# Patient Record
Sex: MALE | Race: WHITE
[De-identification: names, ages, dates, MRNs, and addresses within clinical notes are randomized per-mention and may not be internally consistent; named-entity substitution may affect disease eponyms.]

---

## 2019-04-16 ENCOUNTER — HOSPITAL ENCOUNTER (EMERGENCY)
Dept: HOSPITAL 25 - ED | Age: 24
Discharge: HOME | End: 2019-04-16
Payer: COMMERCIAL

## 2019-04-16 VITALS — DIASTOLIC BLOOD PRESSURE: 88 MMHG | SYSTOLIC BLOOD PRESSURE: 135 MMHG

## 2019-04-16 DIAGNOSIS — F17.210: ICD-10-CM

## 2019-04-16 DIAGNOSIS — R23.3: Primary | ICD-10-CM

## 2019-04-16 LAB
APTT PPP: 31.1 SECONDS (ref 26–36.3)
BASOPHILS # BLD AUTO: 0.1 10^3/UL (ref 0–0.2)
EOSINOPHIL # BLD AUTO: 0.1 10^3/UL (ref 0–0.6)
HCT VFR BLD AUTO: 42 % (ref 36–46)
HGB BLD-MCNC: 13.7 G/DL (ref 14–18)
INR PPP/BLD: 0.94 (ref 0.77–1.02)
LYMPHOCYTES # BLD AUTO: 1.9 10^3/UL (ref 1–4.8)
MCH RBC QN AUTO: 29 PG (ref 27–31)
MCHC RBC AUTO-ENTMCNC: 33 G/DL (ref 31–36)
MCV RBC AUTO: 88 FL (ref 80–94)
MONOCYTES # BLD AUTO: 0.5 10^3/UL (ref 0–0.8)
NEUTROPHILS # BLD AUTO: 7.9 10^3/UL (ref 1.5–7.7)
NRBC # BLD AUTO: 0 10^3/UL
NRBC BLD QL AUTO: 0
PLATELET # BLD AUTO: 313 10^3/UL (ref 150–450)
RBC # BLD AUTO: 4.72 10^6 /UL (ref 4.18–5.48)
WBC # BLD AUTO: 10.4 10^3/UL (ref 3.5–10.8)

## 2019-04-16 PROCEDURE — 85025 COMPLETE CBC W/AUTO DIFF WBC: CPT

## 2019-04-16 PROCEDURE — 36415 COLL VENOUS BLD VENIPUNCTURE: CPT

## 2019-04-16 PROCEDURE — 85610 PROTHROMBIN TIME: CPT

## 2019-04-16 PROCEDURE — 99283 EMERGENCY DEPT VISIT LOW MDM: CPT

## 2019-04-16 PROCEDURE — 85730 THROMBOPLASTIN TIME PARTIAL: CPT

## 2019-04-16 NOTE — ED
GI/ HPI





- HPI Summary


HPI Summary: 


Patient is a 23 y/o M presenting to ED with complaints of abdominal bruising 

and blood in stool. He states that he has "multiple" GI issues and is scheduled 

for colonscopy. Patient has had bruise on RLQ area for the past few weeks. He 

states that this bruise spontaneously went away. However, today, he took a nap 

and then the bruise reappeared. He notes that he did not experience any trauma 

to this area. Patient is on lithium and effexor. No other bruises on body are 

reported. Patient notes that the bruise is darker in color and slightly larger 

than previously. On triage, pain is rated 2/10. Nothing is noted to aggravate/

alleviate Sx. Home medications and allergies are reviewed.  








- History of Current Complaint


Chief Complaint: EDGIBleed


Time Seen by Provider: 04/16/19 01:10


Stated Complaint: "POSS GI PROBLEM, WITH BRUISING ON ABD" PER PT


Hx Obtained From: Patient


Onset/Duration: Started Weeks Ago - bruise first appeared a few weeks ago, 

Still Present, Worse Since - bruised area expanded/got darker today


Timing: Lasting Hours - bruised area expanded/got darker today, Lasting Weeks - 

bruise first appeared a few weeks ago


Current Severity: Mild - 2/10


Pain Intensity: 2


Associated Signs and Symptoms: Positive: Bruising, Blood w/Stool


Aggravating Factor(s): Nothing


Alleviating Factor(s): Nothing





- Allergy/Home Medications


Allergies/Adverse Reactions: 


 Allergies











Allergy/AdvReac Type Severity Reaction Status Date / Time


 


bupropion [From Wellbutrin] Allergy  Unknown Verified 04/15/19 10:56





   Reaction  





   Details  


 


oxcarbazepine Allergy  Unknown Verified 04/15/19 10:56





[From Trileptal]   Reaction  





   Details  














PMH/Surg Hx/FS Hx/Imm Hx


Sensory History: 


   Denies: Hx Legally Blind, Hx Deafness


Opthamlomology History: 


   Denies: Hx Legally Blind


EENT History: 


   Denies: Hx Deafness


Infectious Disease History: No


Infectious Disease History: 


   Denies: Traveled Outside the [place] in Last 30 Days





- Family History


Known Family History: Positive: Diabetes, Other - thyroid problems, stomach 

ulcers 





- Social History


Alcohol Use: Occasionally


Substance Use Type: Reports: Cocaine, Marijuana


Smoking Status (MU): Current Every Day Smoker





Review of Systems


Negative: Fever - on vitals, temp is 98.1 F 


Genitourinary: Other - POSITIVE - BLOOD IN STOOL 


Musculoskeletal: Other - POSITIVE - ECCHYMOTIC AREA AT RLQ 


All Other Systems Reviewed And Are Negative: Yes





Physical Exam





- Summary


Physical Exam Summary: 


VITAL SIGNS: Reviewed.


GENERAL:  Patient is a well-developed and nourished male who is lying 

comfortable in the stretcher. Patient is not in any acute respiratory distress.


HEAD AND FACE: No signs of trauma. No ecchymosis, hematomas or skull 

depressions. No sinus tenderness.


EYES: PERRLA, EOMI x 2, No injected conjunctiva, no nystagmus.


EARS: Hearing grossly intact. Ear canals and tympanic membranes are within 

normal limits.


MOUTH: Oropharynx within normal limits.


NECK: Supple, trachea is midline, no adenopathy, no JVD, no carotid bruit, no c-

spine tenderness, neck with full ROM.


CHEST: Symmetric, no tenderness at palpation


LUNGS: Clear to auscultation bilaterally. No wheezing or crackles.


CVS: Regular rate and rhythm, S1 and S2 present, no murmurs or gallops 

appreciated.


ABDOMEN: Soft, non-tender. No signs of distention. No rebound no guarding, and 

no masses palpated. Bowel sounds are normal.


EXTREMITIES: FROM in all major joints, no edema, no cyanosis or clubbing.


NEURO: Alert and oriented x 3. No acute neurological deficits. Speech is normal 

and follows commands.


SKIN: Dry and warm; 2.5 x 1.5 inch ecchymotic area at RLQ that is tender








Triage Information Reviewed: Yes


Vital Signs On Initial Exam: 


 Initial Vitals











Temp Pulse Resp BP Pulse Ox


 


 98.1 F   98   20   148/101   98 


 


 04/16/19 00:20  04/16/19 00:20  04/16/19 00:20  04/16/19 00:20  04/16/19 00:20











Vital Signs Reviewed: Yes





Diagnostics





- Vital Signs


 Vital Signs











  Temp Pulse Resp BP Pulse Ox


 


 04/16/19 00:20  98.1 F  98  20  148/101  98














- Laboratory


Result Diagrams: 


 04/16/19 01:27





Lab Statement: Any lab studies that have been ordered have been reviewed, and 

results considered in the medical decision making process.





GIGU Course/Dx





- Course


Course Of Treatment: Patient is a 23 y/o M presenting to ED with complaints of 

abdominal bruising and blood in stool. He states that he has "multiple" GI 

issues and is scheduled for colonscopy. Patient has had bruise on RLQ area for 

the past few weeks. He states that this bruise spontaneously went away. However

, today, he took a nap and then the bruise reappeared. He notes that he did not 

experience any trauma to this area. Patient is on lithium and effexor. No other 

bruises on body are reported. Patient notes that the bruise is darker in color 

and slightly larger than previously.  On physical exam, 2.5 x 1.5 inch 

ecchymotic area at RLQ that is tender is noted.  Labs showed RBC 4.17, Hgb 12.5

, absolute monos 1, trop 0.01, CRP 34.63, total protein 6.2, globulin 1.9, TSH 

2.33.  During ED course, patinet was given Tylenol 650 mg PO.  .  Patient was 

discharged to home and will follow up with PCP





- Diagnoses


Provider Diagnoses: 


 Ecchymosis








Discharge





- Sign-Out/Discharge


Documenting (check all that apply): Patient Departure - discharge


Patient Received Moderate/Deep Sedation with Procedure: No





- Discharge Plan


Condition: Stable


Disposition: HOME


Patient Education Materials:  Contusion in Adults (ED)


Referrals: 


[facility] [Outside] - 3 Days


Additional Instructions: 


PLEASE RETURN TO THE EMERGENCY DEPARTMENT IMMEDIATELY FOR WORSENING OR 

CONCERNING SYMPTOMS. FOLLOW UP WITH YOUR PRIMARY CARE PHYSICIAN WITHIN THREE 

DAYS. 





- Attestation Statements


Document Initiated by Scribe: Yes


Documenting Scribe: STACEY ALMARAZ


Provider For Whom Jacobo is Documenting (Include Credential): STAR FENTON MD


Scribe Attestation: 


ISTACEY, scribed for STAR FENTON MD on 04/16/19 at 0309. 


Status of Scribe Document: Ready

## 2019-07-28 ENCOUNTER — HOSPITAL ENCOUNTER (EMERGENCY)
Dept: HOSPITAL 25 - UCEAST | Age: 24
Discharge: HOME | End: 2019-07-28
Payer: COMMERCIAL

## 2019-07-28 DIAGNOSIS — M79.662: Primary | ICD-10-CM

## 2019-07-28 DIAGNOSIS — F17.290: ICD-10-CM

## 2019-07-28 DIAGNOSIS — E11.9: ICD-10-CM

## 2019-07-28 DIAGNOSIS — F31.9: ICD-10-CM

## 2019-07-28 PROCEDURE — G0463 HOSPITAL OUTPT CLINIC VISIT: HCPCS

## 2019-07-28 PROCEDURE — 99212 OFFICE O/P EST SF 10 MIN: CPT

## 2019-08-14 ENCOUNTER — HOSPITAL ENCOUNTER (EMERGENCY)
Dept: HOSPITAL 25 - UCEAST | Age: 24
Discharge: HOME | End: 2019-08-14
Payer: COMMERCIAL

## 2019-08-14 VITALS — DIASTOLIC BLOOD PRESSURE: 77 MMHG | SYSTOLIC BLOOD PRESSURE: 135 MMHG

## 2019-08-14 DIAGNOSIS — F17.210: ICD-10-CM

## 2019-08-14 DIAGNOSIS — H10.9: Primary | ICD-10-CM

## 2019-08-14 DIAGNOSIS — F64.0: ICD-10-CM

## 2019-08-14 PROCEDURE — 99212 OFFICE O/P EST SF 10 MIN: CPT

## 2019-08-14 PROCEDURE — G0463 HOSPITAL OUTPT CLINIC VISIT: HCPCS

## 2019-10-29 ENCOUNTER — HOSPITAL ENCOUNTER (EMERGENCY)
Dept: HOSPITAL 25 - ED | Age: 24
Discharge: HOME | End: 2019-10-29
Payer: COMMERCIAL

## 2019-10-29 VITALS — DIASTOLIC BLOOD PRESSURE: 104 MMHG | SYSTOLIC BLOOD PRESSURE: 120 MMHG

## 2019-10-29 DIAGNOSIS — Z90.13: ICD-10-CM

## 2019-10-29 DIAGNOSIS — Z88.8: ICD-10-CM

## 2019-10-29 DIAGNOSIS — A74.9: ICD-10-CM

## 2019-10-29 DIAGNOSIS — K52.9: Primary | ICD-10-CM

## 2019-10-29 DIAGNOSIS — E11.9: ICD-10-CM

## 2019-10-29 DIAGNOSIS — F41.9: ICD-10-CM

## 2019-10-29 DIAGNOSIS — F17.290: ICD-10-CM

## 2019-10-29 DIAGNOSIS — Z79.899: ICD-10-CM

## 2019-10-29 LAB
ALBUMIN SERPL BCG-MCNC: 4.2 G/DL (ref 3.2–5.2)
ALBUMIN/GLOB SERPL: 1.4 {RATIO} (ref 1–3)
ALP SERPL-CCNC: 105 U/L (ref 34–104)
ALT SERPL W P-5'-P-CCNC: 27 U/L (ref 7–52)
ANION GAP SERPL CALC-SCNC: 7 MMOL/L (ref 2–11)
AST SERPL-CCNC: 30 U/L (ref 13–39)
BASOPHILS # BLD AUTO: 0 10^3/UL (ref 0–0.2)
BUN SERPL-MCNC: 17 MG/DL (ref 6–24)
BUN/CREAT SERPL: 21.3 (ref 8–20)
CALCIUM SERPL-MCNC: 9.3 MG/DL (ref 8.6–10.3)
CHLORIDE SERPL-SCNC: 106 MMOL/L (ref 101–111)
EOSINOPHIL # BLD AUTO: 0.2 10^3/UL (ref 0–0.6)
GLOBULIN SER CALC-MCNC: 3 G/DL (ref 2–4)
GLUCOSE SERPL-MCNC: 130 MG/DL (ref 70–100)
HCG SERPL QL: 1.31 MIU/ML
HCO3 SERPL-SCNC: 23 MMOL/L (ref 22–32)
HCT VFR BLD AUTO: 40 % (ref 35–47)
HGB BLD-MCNC: 13 G/DL (ref 12–16)
LYMPHOCYTES # BLD AUTO: 1.4 10^3/UL (ref 1–4.8)
MCH RBC QN AUTO: 29 PG (ref 27–31)
MCHC RBC AUTO-ENTMCNC: 33 G/DL (ref 31–36)
MCV RBC AUTO: 88 FL (ref 80–97)
MONOCYTES # BLD AUTO: 0.5 10^3/UL (ref 0–0.8)
NEUTROPHILS # BLD AUTO: 11.8 10^3/UL (ref 1.5–7.7)
NRBC # BLD AUTO: 0 10^3/UL
NRBC BLD QL AUTO: 0
PLATELET # BLD AUTO: 291 10^3/UL (ref 150–450)
POTASSIUM SERPL-SCNC: 3.8 MMOL/L (ref 3.5–5)
PROT SERPL-MCNC: 7.2 G/DL (ref 6.4–8.9)
RBC # BLD AUTO: 4.5 10^6 /UL (ref 3.7–4.87)
SODIUM SERPL-SCNC: 136 MMOL/L (ref 135–145)
WBC # BLD AUTO: 14 10^3/UL (ref 3.5–10.8)
WBC UR QL AUTO: (no result)

## 2019-10-29 PROCEDURE — 87591 N.GONORRHOEAE DNA AMP PROB: CPT

## 2019-10-29 PROCEDURE — 80053 COMPREHEN METABOLIC PANEL: CPT

## 2019-10-29 PROCEDURE — 84702 CHORIONIC GONADOTROPIN TEST: CPT

## 2019-10-29 PROCEDURE — 87491 CHLMYD TRACH DNA AMP PROBE: CPT

## 2019-10-29 PROCEDURE — 87086 URINE CULTURE/COLONY COUNT: CPT

## 2019-10-29 PROCEDURE — 99282 EMERGENCY DEPT VISIT SF MDM: CPT

## 2019-10-29 PROCEDURE — 36415 COLL VENOUS BLD VENIPUNCTURE: CPT

## 2019-10-29 PROCEDURE — 81015 MICROSCOPIC EXAM OF URINE: CPT

## 2019-10-29 PROCEDURE — 83690 ASSAY OF LIPASE: CPT

## 2019-10-29 PROCEDURE — 85025 COMPLETE CBC W/AUTO DIFF WBC: CPT

## 2019-10-29 PROCEDURE — 96374 THER/PROPH/DIAG INJ IV PUSH: CPT

## 2019-10-29 PROCEDURE — 86140 C-REACTIVE PROTEIN: CPT

## 2019-10-29 PROCEDURE — 96361 HYDRATE IV INFUSION ADD-ON: CPT

## 2019-10-29 PROCEDURE — 81003 URINALYSIS AUTO W/O SCOPE: CPT

## 2019-12-23 ENCOUNTER — HOSPITAL ENCOUNTER (EMERGENCY)
Dept: HOSPITAL 25 - ED | Age: 24
Discharge: HOME | End: 2019-12-23
Payer: COMMERCIAL

## 2019-12-23 VITALS — DIASTOLIC BLOOD PRESSURE: 116 MMHG | SYSTOLIC BLOOD PRESSURE: 165 MMHG

## 2019-12-23 DIAGNOSIS — E11.9: ICD-10-CM

## 2019-12-23 DIAGNOSIS — F43.10: ICD-10-CM

## 2019-12-23 DIAGNOSIS — F64.0: ICD-10-CM

## 2019-12-23 DIAGNOSIS — F41.9: ICD-10-CM

## 2019-12-23 DIAGNOSIS — F17.290: ICD-10-CM

## 2019-12-23 DIAGNOSIS — Z32.01: Primary | ICD-10-CM

## 2019-12-23 LAB
ALBUMIN SERPL BCG-MCNC: 4.2 G/DL (ref 3.2–5.2)
ALBUMIN/GLOB SERPL: 1.4 {RATIO} (ref 1–3)
ALP SERPL-CCNC: 88 U/L (ref 34–104)
ALT SERPL W P-5'-P-CCNC: 24 U/L (ref 7–52)
ANION GAP SERPL CALC-SCNC: 6 MMOL/L (ref 2–11)
AST SERPL-CCNC: 21 U/L (ref 13–39)
BASOPHILS # BLD AUTO: 0 10^3/UL (ref 0–0.2)
BUN SERPL-MCNC: 9 MG/DL (ref 6–24)
BUN/CREAT SERPL: 11.5 (ref 8–20)
CALCIUM SERPL-MCNC: 8.8 MG/DL (ref 8.6–10.3)
CHLORIDE SERPL-SCNC: 105 MMOL/L (ref 101–111)
EOSINOPHIL # BLD AUTO: 0.1 10^3/UL (ref 0–0.6)
GLOBULIN SER CALC-MCNC: 3.1 G/DL (ref 2–4)
GLUCOSE SERPL-MCNC: 94 MG/DL (ref 70–100)
HCG SERPL QL: 168.53 MIU/ML
HCO3 SERPL-SCNC: 25 MMOL/L (ref 22–32)
HCT VFR BLD AUTO: 38 % (ref 35–47)
HGB BLD-MCNC: 12.6 G/DL (ref 12–16)
LYMPHOCYTES # BLD AUTO: 1.3 10^3/UL (ref 1–4.8)
MCH RBC QN AUTO: 30 PG (ref 27–31)
MCHC RBC AUTO-ENTMCNC: 34 G/DL (ref 31–36)
MCV RBC AUTO: 89 FL (ref 80–97)
MONOCYTES # BLD AUTO: 0.5 10^3/UL (ref 0–0.8)
NEUTROPHILS # BLD AUTO: 7.3 10^3/UL (ref 1.5–7.7)
NRBC # BLD AUTO: 0 10^3/UL
NRBC BLD QL AUTO: 0
PLATELET # BLD AUTO: 331 10^3/UL (ref 150–450)
POTASSIUM SERPL-SCNC: 3.8 MMOL/L (ref 3.5–5)
PROT SERPL-MCNC: 7.3 G/DL (ref 6.4–8.9)
RBC # BLD AUTO: 4.25 10^6 /UL (ref 3.7–4.87)
SODIUM SERPL-SCNC: 136 MMOL/L (ref 135–145)
WBC # BLD AUTO: 9.3 10^3/UL (ref 3.5–10.8)

## 2019-12-23 PROCEDURE — 99282 EMERGENCY DEPT VISIT SF MDM: CPT

## 2019-12-23 PROCEDURE — 85025 COMPLETE CBC W/AUTO DIFF WBC: CPT

## 2019-12-23 PROCEDURE — 80053 COMPREHEN METABOLIC PANEL: CPT

## 2019-12-23 PROCEDURE — 84702 CHORIONIC GONADOTROPIN TEST: CPT

## 2019-12-23 PROCEDURE — 36415 COLL VENOUS BLD VENIPUNCTURE: CPT
